# Patient Record
Sex: MALE | Race: BLACK OR AFRICAN AMERICAN | NOT HISPANIC OR LATINO | URBAN - METROPOLITAN AREA
[De-identification: names, ages, dates, MRNs, and addresses within clinical notes are randomized per-mention and may not be internally consistent; named-entity substitution may affect disease eponyms.]

---
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
## 2025-01-14 NOTE — BRIEF OPERATIVE NOTE - DISPOSITION
Pt last seen by PCP on: 10/30/2024   Pt was advised to return in: 25 weeks  Pt has next appt scheduled: None    TSH (mcUnits/mL)   Date Value   11/06/2024 2.560     Refilled until due back per PCP.    
pacu, home